# Patient Record
Sex: MALE | URBAN - METROPOLITAN AREA
[De-identification: names, ages, dates, MRNs, and addresses within clinical notes are randomized per-mention and may not be internally consistent; named-entity substitution may affect disease eponyms.]

---

## 2021-04-28 ENCOUNTER — TELEPHONE (OUTPATIENT)
Dept: OPHTHALMOLOGY | Age: 66
End: 2021-04-28

## 2021-04-30 ENCOUNTER — OFFICE VISIT (OUTPATIENT)
Dept: OPHTHALMOLOGY | Age: 66
End: 2021-04-30

## 2021-04-30 DIAGNOSIS — H16.142 SUPERFICIAL PUNCTATE KERATITIS OF LEFT EYE: Primary | ICD-10-CM

## 2021-04-30 PROCEDURE — 99202 OFFICE O/P NEW SF 15 MIN: CPT | Performed by: OPHTHALMOLOGY

## 2021-04-30 RX ORDER — ERYTHROMYCIN 5 MG/G
OINTMENT OPHTHALMIC
COMMUNITY
Start: 2021-04-27

## 2021-04-30 RX ORDER — PREDNISOLONE ACETATE 10 MG/ML
1 SUSPENSION/ DROPS OPHTHALMIC 4 TIMES DAILY
Qty: 5 ML | Refills: 0 | Status: SHIPPED | OUTPATIENT
Start: 2021-04-30

## 2021-05-22 ENCOUNTER — TELEPHONE (OUTPATIENT)
Dept: AUDIOLOGY | Age: 66
End: 2021-05-22

## 2021-05-26 ENCOUNTER — TELEPHONE (OUTPATIENT)
Dept: AUDIOLOGY | Age: 66
End: 2021-05-26

## 2021-05-26 ENCOUNTER — OFFICE VISIT (OUTPATIENT)
Dept: AUDIOLOGY | Age: 66
End: 2021-05-26

## 2021-05-26 DIAGNOSIS — H90.3 SENSORINEURAL HEARING LOSS (SNHL) OF BOTH EARS: Primary | ICD-10-CM

## 2021-05-26 PROCEDURE — 92557 COMPREHENSIVE HEARING TEST: CPT | Performed by: AUDIOLOGIST

## 2021-05-26 PROCEDURE — 92567 TYMPANOMETRY: CPT | Performed by: AUDIOLOGIST

## 2024-01-26 ENCOUNTER — TELEPHONE (OUTPATIENT)
Dept: GASTROENTEROLOGY | Age: 69
End: 2024-01-26

## 2024-01-29 ENCOUNTER — TELEPHONE (OUTPATIENT)
Dept: GASTROENTEROLOGY | Age: 69
End: 2024-01-29

## 2024-02-16 ENCOUNTER — TELEPHONE (OUTPATIENT)
Dept: BEHAVIORAL HEALTH | Age: 69
End: 2024-02-16

## 2024-04-16 ENCOUNTER — APPOINTMENT (OUTPATIENT)
Dept: OTOLARYNGOLOGY | Age: 69
End: 2024-04-16

## 2024-04-16 VITALS — BODY MASS INDEX: 27.03 KG/M2 | WEIGHT: 225 LBS

## 2024-04-16 DIAGNOSIS — R06.02 SOB (SHORTNESS OF BREATH): Primary | ICD-10-CM

## 2024-04-16 DIAGNOSIS — Z91.09 ENVIRONMENTAL ALLERGIES: ICD-10-CM

## 2024-04-16 DIAGNOSIS — K21.9 GASTROESOPHAGEAL REFLUX DISEASE WITHOUT ESOPHAGITIS: ICD-10-CM

## 2024-04-16 DIAGNOSIS — J32.9 SINUSITIS, UNSPECIFIED CHRONICITY, UNSPECIFIED LOCATION: ICD-10-CM

## 2024-04-16 PROCEDURE — 31575 DIAGNOSTIC LARYNGOSCOPY: CPT | Performed by: OTOLARYNGOLOGY

## 2024-04-16 PROCEDURE — 99203 OFFICE O/P NEW LOW 30 MIN: CPT | Performed by: OTOLARYNGOLOGY

## 2024-04-16 RX ORDER — METOPROLOL SUCCINATE 100 MG/1
100 TABLET, EXTENDED RELEASE ORAL DAILY
COMMUNITY
Start: 2024-03-25

## 2024-04-16 RX ORDER — AMITRIPTYLINE HYDROCHLORIDE 25 MG/1
37.5 TABLET, FILM COATED ORAL NIGHTLY
COMMUNITY

## 2024-04-16 RX ORDER — OMEPRAZOLE 20 MG/1
20 CAPSULE, DELAYED RELEASE ORAL DAILY
COMMUNITY
End: 2024-04-16 | Stop reason: DRUGHIGH

## 2024-04-16 RX ORDER — PAROXETINE HYDROCHLORIDE 40 MG/1
40 TABLET, FILM COATED ORAL DAILY
COMMUNITY
Start: 2024-03-16

## 2024-04-16 RX ORDER — NABUMETONE 750 MG/1
750 TABLET, FILM COATED ORAL 2 TIMES DAILY PRN
COMMUNITY

## 2024-04-16 RX ORDER — OMEPRAZOLE 20 MG/1
20 CAPSULE, DELAYED RELEASE ORAL 2 TIMES DAILY
Qty: 180 CAPSULE | Refills: 0 | Status: SHIPPED | OUTPATIENT
Start: 2024-04-16

## 2024-04-16 RX ORDER — TAMSULOSIN HYDROCHLORIDE 0.4 MG/1
0.4 CAPSULE ORAL DAILY
COMMUNITY

## 2024-04-16 RX ORDER — BUSPIRONE HYDROCHLORIDE 15 MG/1
1 TABLET ORAL 2 TIMES DAILY
COMMUNITY

## 2024-04-16 RX ORDER — GABAPENTIN 100 MG/1
200 CAPSULE ORAL 3 TIMES DAILY
COMMUNITY
Start: 2024-03-12

## 2024-04-16 RX ORDER — METRONIDAZOLE 500 MG/1
500 TABLET ORAL EVERY 12 HOURS
COMMUNITY
Start: 2024-01-15

## 2024-04-16 RX ORDER — PAROXETINE 10 MG/1
10 TABLET, FILM COATED ORAL EVERY MORNING
COMMUNITY

## 2024-04-22 ENCOUNTER — TELEPHONE (OUTPATIENT)
Dept: ALLERGY | Age: 69
End: 2024-04-22

## 2024-04-23 ENCOUNTER — LAB SERVICES (OUTPATIENT)
Dept: LAB | Age: 69
End: 2024-04-23

## 2024-04-23 ENCOUNTER — OFFICE VISIT (OUTPATIENT)
Dept: ALLERGY | Age: 69
End: 2024-04-23
Attending: OTOLARYNGOLOGY

## 2024-04-23 VITALS
WEIGHT: 225 LBS | HEIGHT: 77 IN | TEMPERATURE: 96.1 F | OXYGEN SATURATION: 98 % | HEART RATE: 59 BPM | DIASTOLIC BLOOD PRESSURE: 92 MMHG | SYSTOLIC BLOOD PRESSURE: 160 MMHG | BODY MASS INDEX: 26.57 KG/M2

## 2024-04-23 DIAGNOSIS — R06.02 SHORTNESS OF BREATH: ICD-10-CM

## 2024-04-23 DIAGNOSIS — R09.A2 GLOBUS SENSATION: ICD-10-CM

## 2024-04-23 DIAGNOSIS — H10.10 ALLERGIC RHINOCONJUNCTIVITIS: ICD-10-CM

## 2024-04-23 DIAGNOSIS — J30.9 ALLERGIC RHINOCONJUNCTIVITIS: ICD-10-CM

## 2024-04-23 DIAGNOSIS — J98.01 BRONCHOSPASM: ICD-10-CM

## 2024-04-23 DIAGNOSIS — J30.9 ALLERGIC RHINOCONJUNCTIVITIS: Primary | ICD-10-CM

## 2024-04-23 DIAGNOSIS — H10.10 ALLERGIC RHINOCONJUNCTIVITIS: Primary | ICD-10-CM

## 2024-04-23 DIAGNOSIS — I10 PRIMARY HYPERTENSION: ICD-10-CM

## 2024-04-23 LAB
BASOPHILS # BLD: 0 K/MCL (ref 0–0.3)
BASOPHILS NFR BLD: 1 %
DEPRECATED RDW RBC: 40.9 FL (ref 39–50)
EOSINOPHIL # BLD: 0.1 K/MCL (ref 0–0.5)
EOSINOPHIL NFR BLD: 1 %
ERYTHROCYTE [DISTWIDTH] IN BLOOD: 12.4 % (ref 11–15)
HCT VFR BLD CALC: 39 % (ref 39–51)
HGB BLD-MCNC: 13.6 G/DL (ref 13–17)
IMM GRANULOCYTES # BLD AUTO: 0.1 K/MCL (ref 0–0.2)
IMM GRANULOCYTES # BLD: 1 %
LYMPHOCYTES # BLD: 0.9 K/MCL (ref 1–4)
LYMPHOCYTES NFR BLD: 14 %
MCH RBC QN AUTO: 31.8 PG (ref 26–34)
MCHC RBC AUTO-ENTMCNC: 34.9 G/DL (ref 32–36.5)
MCV RBC AUTO: 91.1 FL (ref 78–100)
MONOCYTES # BLD: 0.4 K/MCL (ref 0.3–0.9)
MONOCYTES NFR BLD: 7 %
NEUTROPHILS # BLD: 5.2 K/MCL (ref 1.8–7.7)
NEUTROPHILS NFR BLD: 76 %
NRBC BLD MANUAL-RTO: 0 /100 WBC
PLATELET # BLD AUTO: 299 K/MCL (ref 140–450)
RBC # BLD: 4.28 MIL/MCL (ref 4.5–5.9)
WBC # BLD: 6.7 K/MCL (ref 4.2–11)

## 2024-04-23 PROCEDURE — 85025 COMPLETE CBC W/AUTO DIFF WBC: CPT | Performed by: INTERNAL MEDICINE

## 2024-04-23 PROCEDURE — 36415 COLL VENOUS BLD VENIPUNCTURE: CPT | Performed by: NURSE PRACTITIONER

## 2024-04-23 PROCEDURE — 99204 OFFICE O/P NEW MOD 45 MIN: CPT | Performed by: NURSE PRACTITIONER

## 2024-04-23 PROCEDURE — 3080F DIAST BP >= 90 MM HG: CPT | Performed by: NURSE PRACTITIONER

## 2024-04-23 PROCEDURE — 86003 ALLG SPEC IGE CRUDE XTRC EA: CPT | Performed by: INTERNAL MEDICINE

## 2024-04-23 PROCEDURE — 3077F SYST BP >= 140 MM HG: CPT | Performed by: NURSE PRACTITIONER

## 2024-04-23 PROCEDURE — 82785 ASSAY OF IGE: CPT | Performed by: CLINICAL MEDICAL LABORATORY

## 2024-04-23 RX ORDER — FLUTICASONE PROPIONATE AND SALMETEROL 100; 50 UG/1; UG/1
1 POWDER RESPIRATORY (INHALATION)
Qty: 1 EACH | Refills: 5 | Status: SHIPPED | OUTPATIENT
Start: 2024-04-23

## 2024-04-23 RX ORDER — ALBUTEROL SULFATE 90 UG/1
2 AEROSOL, METERED RESPIRATORY (INHALATION) EVERY 4 HOURS PRN
Qty: 1 EACH | Refills: 0 | Status: SHIPPED | OUTPATIENT
Start: 2024-04-23

## 2024-04-23 ASSESSMENT — ENCOUNTER SYMPTOMS
SINUS PAIN: 0
SHORTNESS OF BREATH: 1
FACIAL SWELLING: 0
HEADACHES: 0
NERVOUS/ANXIOUS: 0
SORE THROAT: 0
RHINORRHEA: 1
ABDOMINAL PAIN: 0
FEVER: 0
VOMITING: 0
APPETITE CHANGE: 0
ADENOPATHY: 0
DIARRHEA: 0
WHEEZING: 1
SINUS PRESSURE: 1
SLEEP DISTURBANCE: 0
CHEST TIGHTNESS: 0
COUGH: 1

## 2024-04-24 LAB
ALLERGEN:  CLADOSPORIUM HERBARUM (HORMODENDRUM), IGE - SEQUOIA: <0.1 KU/L
ALLERGEN:  COCKROACH (GERMAN), IGE - SEQUOIA: <0.1 KU/L
ALLERGEN:  COCKSFOOT GRASS (ORCHARD), IGE - SEQUOIA: <0.1 KU/L
ALLERGEN:  D. FARINAE, IGE - SEQUOIA: <0.1 KU/L
ALLERGEN:  D. PTERONYSSINUS, IGE - SEQUOIA: <0.1 KU/L
ALLERGEN:  DOG DANDER, IGE - SEQUOIA: <0.1 KU/L
ALLERGEN: ALTERNARIA TENUIS, IGE - SEQUOIA: <0.1 KU/L
ALLERGEN: ASPERGILLUS FUMIGATUS, IGE - SEQUOIA: <0.1 KU/L
ALLERGEN: ASPERGILLUS NIGER, IGE - SEQUOIA: <0.1 KU/L
ALLERGEN: AUREOBASIDIUM PULLULANS, IGE - SEQUOIA: <0.1 KU/L
ALLERGEN: BEECH TREE, IGE - SEQUOIA: <0.1 KU/L
ALLERGEN: BERMUDA GRASS, IGE - SEQUOIA: <0.1 KU/L
ALLERGEN: BIRCH TREE, IGE - SEQUOIA: <0.1 KU/L
ALLERGEN: BOX ELDER TREE, IGE - SEQUOIA: <0.1 KU/L
ALLERGEN: CAT DANDER, IGE - SEQUOIA: <0.1 KU/L
ALLERGEN: CEDAR OR RED CEDAR TREE - SEQUOIA: <0.1 KU/L
ALLERGEN: ELM TREE, IGE - SEQUOIA: <0.1 KU/L
ALLERGEN: ENGLISH PLANTAIN, IGE - SEQUOIA: <0.1 KU/L
ALLERGEN: EPICOCCUM PURPURASCENS, IGE - SEQUOIA: <0.1 KU/L
ALLERGEN: FUSARIUM PROLIFERATUM, IGE - SEQUOIA: <0.1 KU/L
ALLERGEN: HICKORY AND PECAN TREE, IGE - SEQUOIA: <0.1 KU/L
ALLERGEN: JOHNSON GRASS, IGE - SEQUOIA: <0.1 KU/L
ALLERGEN: MEADOW GRASS (KENTUCKY), IGE - SEQUOIA: <0.1 KU/L
ALLERGEN: MUCOR RACEMOSUS, IGE - SEQUOIA: <0.1 KU/L
ALLERGEN: MUGWORT, IGE - SEQUOIA: <0.1 KU/L
ALLERGEN: OAK TREE, IGE - SEQUOIA: <0.1 KU/L
ALLERGEN: PENICILLIUM CHRYSOGENUM, IGE - SEQUOIA: <0.1 KU/L
ALLERGEN: PHOMA BETAE, IGE - SEQUOIA: <0.1 KU/L
ALLERGEN: RAGWEED, GIANT, IGE - SEQUOIA: <0.1 KU/L
ALLERGEN: REDTOP GRASS, IGE - SEQUOIA: <0.1 KU/L
ALLERGEN: RUSSIAN THISTLE, IGE - SEQUOIA: <0.1 KU/L
ALLERGEN: RYE GRASS, IGE - SEQUOIA: <0.1 KU/L
ALLERGEN: SETOMELANOMMA ROSTRATA, IGE - SEQUOIA: <0.1 KU/L
ALLERGEN: SHORT RAGWEED, IGE - SEQUOIA: <0.1 KU/L
ALLERGEN: SYCAMORE TREE, IGE - SEQUOIA: <0.1 KU/L
ALLERGEN: TIMOTHY GRASS, IGE - SEQUOIA: <0.1 KU/L
ALLERGEN: WALNUT TREE, IGE - SEQUOIA: <0.1 KU/L
ALLERGEN: WHITE ASH TREE, IGE - SEQUOIA: <0.1 KU/L
ALLERGEN: WILLOW TREE, IGE - SEQUOIA: <0.1 KU/L
IGE SERPL-ACNC: 6.5 IUNITS/ML

## 2024-04-30 ENCOUNTER — HOSPITAL ENCOUNTER (OUTPATIENT)
Dept: CT IMAGING | Age: 69
Discharge: HOME | End: 2024-04-30
Attending: OTOLARYNGOLOGY

## 2024-04-30 DIAGNOSIS — J32.9 SINUSITIS, UNSPECIFIED CHRONICITY, UNSPECIFIED LOCATION: ICD-10-CM

## 2024-04-30 PROCEDURE — 70486 CT MAXILLOFACIAL W/O DYE: CPT

## 2024-05-06 ENCOUNTER — TELEPHONE (OUTPATIENT)
Dept: BEHAVIORAL HEALTH | Age: 69
End: 2024-05-06

## 2024-05-07 ENCOUNTER — IMAGING SERVICES (OUTPATIENT)
Dept: GENERAL RADIOLOGY | Age: 69
End: 2024-05-07
Attending: INTERNAL MEDICINE

## 2024-05-07 ENCOUNTER — APPOINTMENT (OUTPATIENT)
Dept: PULMONOLOGY | Age: 69
End: 2024-05-07
Attending: OTOLARYNGOLOGY

## 2024-05-07 VITALS
DIASTOLIC BLOOD PRESSURE: 96 MMHG | HEART RATE: 69 BPM | TEMPERATURE: 96.5 F | SYSTOLIC BLOOD PRESSURE: 164 MMHG | OXYGEN SATURATION: 98 % | WEIGHT: 226.96 LBS | HEIGHT: 77 IN | BODY MASS INDEX: 26.8 KG/M2

## 2024-05-07 DIAGNOSIS — R07.89 CHEST TIGHTNESS: ICD-10-CM

## 2024-05-07 DIAGNOSIS — R06.02 SOB (SHORTNESS OF BREATH): Primary | ICD-10-CM

## 2024-05-07 DIAGNOSIS — R06.02 SOB (SHORTNESS OF BREATH): ICD-10-CM

## 2024-05-07 PROCEDURE — 71046 X-RAY EXAM CHEST 2 VIEWS: CPT | Performed by: RADIOLOGY

## 2024-05-08 ENCOUNTER — TELEPHONE (OUTPATIENT)
Dept: BEHAVIORAL HEALTH | Age: 69
End: 2024-05-08

## 2024-05-13 ENCOUNTER — OFFICE VISIT (OUTPATIENT)
Dept: PULMONOLOGY | Age: 69
End: 2024-05-13
Attending: INTERNAL MEDICINE

## 2024-05-13 ENCOUNTER — TELEPHONE (OUTPATIENT)
Dept: PULMONOLOGY | Age: 69
End: 2024-05-13

## 2024-05-13 ENCOUNTER — APPOINTMENT (OUTPATIENT)
Dept: PULMONOLOGY | Age: 69
End: 2024-05-13
Attending: INTERNAL MEDICINE

## 2024-05-13 VITALS — WEIGHT: 226 LBS | BODY MASS INDEX: 26.68 KG/M2 | HEIGHT: 77 IN

## 2024-05-13 DIAGNOSIS — R06.02 SOB (SHORTNESS OF BREATH): ICD-10-CM

## 2024-05-13 RX ORDER — ALBUTEROL SULFATE 90 UG/1
AEROSOL, METERED RESPIRATORY (INHALATION)
Qty: 18 G | Refills: 0 | Status: SHIPPED | OUTPATIENT
Start: 2024-05-13

## 2024-05-20 ENCOUNTER — TELEPHONE (OUTPATIENT)
Dept: PULMONOLOGY | Age: 69
End: 2024-05-20

## 2024-06-06 ENCOUNTER — APPOINTMENT (OUTPATIENT)
Dept: BEHAVIORAL HEALTH | Age: 69
End: 2024-06-06

## 2024-06-06 DIAGNOSIS — F33.1 MODERATE EPISODE OF RECURRENT MAJOR DEPRESSIVE DISORDER  (CMD): Primary | ICD-10-CM

## 2024-06-06 DIAGNOSIS — F41.1 GENERALIZED ANXIETY DISORDER: ICD-10-CM

## 2024-06-06 PROCEDURE — 99204 OFFICE O/P NEW MOD 45 MIN: CPT | Performed by: PSYCHIATRY & NEUROLOGY

## 2024-06-06 RX ORDER — ARIPIPRAZOLE 10 MG/1
TABLET ORAL
Qty: 30 TABLET | Refills: 0 | Status: SHIPPED | OUTPATIENT
Start: 2024-06-06

## 2024-06-11 ENCOUNTER — APPOINTMENT (OUTPATIENT)
Dept: ALLERGY | Age: 69
End: 2024-06-11

## 2024-06-17 ENCOUNTER — APPOINTMENT (OUTPATIENT)
Dept: ALLERGY | Age: 69
End: 2024-06-17

## 2024-07-01 ENCOUNTER — APPOINTMENT (OUTPATIENT)
Dept: BEHAVIORAL HEALTH | Age: 69
End: 2024-07-01

## 2024-07-16 ENCOUNTER — APPOINTMENT (OUTPATIENT)
Dept: BEHAVIORAL HEALTH | Age: 69
End: 2024-07-16

## 2024-07-16 DIAGNOSIS — F33.1 MODERATE EPISODE OF RECURRENT MAJOR DEPRESSIVE DISORDER  (CMD): Primary | ICD-10-CM

## 2024-07-16 DIAGNOSIS — F41.1 GENERALIZED ANXIETY DISORDER: ICD-10-CM

## 2024-07-16 PROCEDURE — 99213 OFFICE O/P EST LOW 20 MIN: CPT | Performed by: PSYCHIATRY & NEUROLOGY

## 2024-07-23 ENCOUNTER — APPOINTMENT (OUTPATIENT)
Dept: BEHAVIORAL HEALTH | Age: 69
End: 2024-07-23

## 2024-07-23 DIAGNOSIS — F41.1 GENERALIZED ANXIETY DISORDER: ICD-10-CM

## 2024-07-23 DIAGNOSIS — F33.1 MODERATE EPISODE OF RECURRENT MAJOR DEPRESSIVE DISORDER  (CMD): Primary | ICD-10-CM

## 2024-07-23 PROCEDURE — 99214 OFFICE O/P EST MOD 30 MIN: CPT | Performed by: PSYCHIATRY & NEUROLOGY

## 2024-07-23 RX ORDER — ARIPIPRAZOLE 10 MG/1
TABLET ORAL
Qty: 90 TABLET | Refills: 0 | Status: SHIPPED | OUTPATIENT
Start: 2024-07-23 | End: 2024-07-23 | Stop reason: CLARIF

## 2024-07-23 RX ORDER — AMITRIPTYLINE HYDROCHLORIDE 50 MG/1
50 TABLET, FILM COATED ORAL NIGHTLY
Qty: 90 TABLET | Refills: 0 | Status: SHIPPED | OUTPATIENT
Start: 2024-07-23

## 2024-07-23 RX ORDER — GABAPENTIN 100 MG/1
200 CAPSULE ORAL 3 TIMES DAILY
Qty: 270 CAPSULE | Refills: 0 | Status: SHIPPED | OUTPATIENT
Start: 2024-07-23

## 2024-07-23 RX ORDER — PAROXETINE HYDROCHLORIDE 40 MG/1
40 TABLET, FILM COATED ORAL DAILY
Qty: 90 TABLET | Refills: 0 | Status: SHIPPED | OUTPATIENT
Start: 2024-07-23

## 2024-07-29 RX ORDER — FLUTICASONE PROPIONATE AND SALMETEROL 100; 50 UG/1; UG/1
1 POWDER RESPIRATORY (INHALATION) 2 TIMES DAILY
Qty: 60 EACH | Refills: 5 | Status: SHIPPED | OUTPATIENT
Start: 2024-07-29

## 2024-08-23 ENCOUNTER — APPOINTMENT (OUTPATIENT)
Dept: BEHAVIORAL HEALTH | Age: 69
End: 2024-08-23

## 2024-08-23 DIAGNOSIS — F41.1 GENERALIZED ANXIETY DISORDER: ICD-10-CM

## 2024-08-23 DIAGNOSIS — F33.1 MODERATE EPISODE OF RECURRENT MAJOR DEPRESSIVE DISORDER  (CMD): Primary | ICD-10-CM

## 2024-08-23 RX ORDER — GABAPENTIN 300 MG/1
300 CAPSULE ORAL 2 TIMES DAILY
Qty: 180 CAPSULE | Refills: 0 | Status: SHIPPED | OUTPATIENT
Start: 2024-08-23

## 2024-08-23 RX ORDER — LORAZEPAM 0.5 MG/1
0.5 TABLET ORAL EVERY 8 HOURS PRN
Qty: 200 TABLET | Refills: 0 | Status: SHIPPED | OUTPATIENT
Start: 2024-08-23

## 2024-08-23 RX ORDER — GABAPENTIN 100 MG/1
200 CAPSULE ORAL 2 TIMES DAILY
Qty: 270 CAPSULE | Refills: 0 | Status: SHIPPED | OUTPATIENT
Start: 2024-08-23 | End: 2024-08-23 | Stop reason: SDUPTHER

## 2024-09-03 ENCOUNTER — APPOINTMENT (OUTPATIENT)
Dept: BEHAVIORAL HEALTH | Age: 69
End: 2024-09-03

## 2024-11-01 RX ORDER — PAROXETINE 40 MG/1
40 TABLET, FILM COATED ORAL DAILY
Qty: 90 TABLET | Refills: 0 | Status: SHIPPED | OUTPATIENT
Start: 2024-11-01

## 2024-11-13 RX ORDER — AMITRIPTYLINE HYDROCHLORIDE 50 MG/1
50 TABLET ORAL NIGHTLY
Qty: 90 TABLET | Refills: 0 | Status: SHIPPED | OUTPATIENT
Start: 2024-11-13

## 2024-11-15 RX ORDER — GABAPENTIN 300 MG/1
300 CAPSULE ORAL 2 TIMES DAILY
Qty: 180 CAPSULE | Refills: 0 | Status: SHIPPED | OUTPATIENT
Start: 2024-11-15

## 2024-11-15 RX ORDER — AMITRIPTYLINE HYDROCHLORIDE 50 MG/1
50 TABLET ORAL NIGHTLY
Qty: 90 TABLET | Refills: 0 | OUTPATIENT
Start: 2024-11-15

## 2024-11-15 RX ORDER — LORAZEPAM 0.5 MG/1
0.5 TABLET ORAL EVERY 8 HOURS PRN
Qty: 200 TABLET | Refills: 0 | Status: SHIPPED | OUTPATIENT
Start: 2024-11-15

## 2025-02-02 RX ORDER — PAROXETINE 40 MG/1
40 TABLET, FILM COATED ORAL DAILY
Qty: 90 TABLET | Refills: 0 | OUTPATIENT
Start: 2025-02-02

## 2025-02-02 RX ORDER — AMITRIPTYLINE HYDROCHLORIDE 50 MG/1
50 TABLET ORAL NIGHTLY
Qty: 90 TABLET | Refills: 0 | OUTPATIENT
Start: 2025-02-02

## 2025-02-03 RX ORDER — AMITRIPTYLINE HYDROCHLORIDE 50 MG/1
50 TABLET ORAL NIGHTLY
Qty: 90 TABLET | Refills: 0 | Status: SHIPPED | OUTPATIENT
Start: 2025-02-03

## 2025-02-03 RX ORDER — PAROXETINE 40 MG/1
40 TABLET, FILM COATED ORAL DAILY
Qty: 90 TABLET | Refills: 0 | Status: SHIPPED | OUTPATIENT
Start: 2025-02-03

## 2025-02-04 ENCOUNTER — TELEPHONE (OUTPATIENT)
Age: 70
End: 2025-02-04

## 2025-02-06 ENCOUNTER — APPOINTMENT (OUTPATIENT)
Age: 70
End: 2025-02-06

## 2025-02-24 NOTE — H&P (VIEW-ONLY)
Orthopaedic Foot & Ankle Surgery  [facility]  New Fracture Care Note  Patient:  Adrien Curran   :  1955 - 69 year old male   [facility] Medical Record: [id number]  Date of Service:  2025   CHIEF COMPLAINT / REASON FOR VISIT   This is a NEW PATIENT VISIT for RIGHT ANKLE PAIN, INJURY  Patient presents with:  Consult: Evaluate RIGHT ankle. Patient states on 2025 slipped on ice injuring right ankle. Patient was seen @ [facility]. Advised to go to ER and went to [facility] ER 2025. Dx with distal fib fx. Patient was splinted and here for follow-up care.     History of Present Illness   The patient describes their injury as follows:  Seen at [facility], sent to [facility] ER  Brings a report with diagnosis of lateral fibula fracture  Is in a splint, restricted from wbing using a walker  Location: CLOSED BIMALLEOLAR  ANKLE FRACTURE-DISLOCATION  Quality: The pain is described as SHARP, STABBING, THROBBING  Severity: The pain rates 5/10 and ranges from 4/10 to 7/10   Duration: This injury occurred 3 DAY(S) ago and resulted from - TRAUMATIC EVENT - SLIP AND FALL  Timing: The pain has been CONSTANT  Context: The location has been WITHOUT CHANGE. The quality has been WITHOUT CHANGE. The severity has been WAXING AND WANING  Modifying Factors: The pain improves with REST AND AVOIDING ACTIVITIES THAT CAUSE PAIN, IMMOBILIZATION WITH Immobilization type: SHORT LEG SPLINT without SIDE SLABS, ELEVATION  and worsens with DEPENDENCY OF THE LIMB.  Associated Signs/Symptoms: SWELLING, TENDERNESS, and BRUISING also occurs with this injury.  Other injuries: DENIES  Prior Treatment: ER / ICC VISIT, XRAYS, Immobilization type: SHORT LEG SPLINT without SIDE SLABS, and CRUTCHES  Past Medical History   History reviewed. No pertinent past medical history.  HISTORY OF VENOUS THROMBOEMBOLIC DISEASE: Patient denies personal or family VTED history  Past Surgical History   History reviewed.  No pertinent surgical history.  Social and Family History   Social History    Tobacco Use      Smoking status: Never      Smokeless tobacco: Never    Alcohol use: Not on file    Drug use: Not on file    History reviewed. No pertinent family history.  Current Medications     Current Outpatient Medications   Medication Sig Dispense Refill   • aspirin 81 MG Oral Tab EC Take 1 tablet (81 mg total) by mouth daily. 30 tablet 0   • docusate sodium (COLACE) 100 MG Oral Cap Take 1 capsule (100 mg total) by mouth 2 (two) times daily. Begin the night after surgery 100 capsule 0   • gabapentin 300 MG Oral Cap Take 1 capsule (300 mg total) by mouth nightly for 7 days. 7 capsule 0   • oxyCODONE 5 MG Oral Tab Take 1 tablet (5 mg total) by mouth every 6 (six) hours as needed for Pain (for pain more than 7/10). 15 tablet 0   • acetaminophen 500 MG Oral Cap Take 2 capsules (1,000 mg total) by mouth every 6 (six) hours. Two pills every six hours until pain is less than 5/10, then take as needed 100 capsule 0     Allergies   Not on File  Physical Examination   VITALS: @ BMI: There is no height or weight on file to calculate BMI.  Constitutional: Patient is well-developed, well-nourished, and in no distress.   HENT: Normocephalic and atraumatic. Moist mucous membranes   Eyes: Clear Conjunctivae Right & Left eye without discharge. No scleral icterus, bilateral.   Neck: Neck supple. No JVD, Tracheal deviation or thyromegaly visible.  Cardiovascular: Normal pulses  Pulmonary/Chest: Effort normal. No audible stridor or wheezes No respiratory distress  Abdominal: No visible distension.   Neurological: Alert and oriented to person, place, and time. GCS score is 15.   Skin: Warm, dry Normal turgor non-diaphoretic. No rashes. No erythema. No pallor.   Psychiatric: Mood, memory, affect and judgment normal.     MUSCULOSKELETAL: The affected extremity was examined.  The patient presents in Immobilization type: SHORT LEG SPLINT without SIDE SLABS  using CRUTCHES  It was removed for the examination  Tenderness at fracture site: MODERATE  Swelling around fracture site: MODERATE  Bruising MODERATE  Motor Strength: DIMINISHED 4/5  Light touch sensation: Diminished  Pulses: 2 second capillary refill noted  MEDIAL ANKLE TENDERNESS, SWELLING AND BRUISING  XRAY & ADVANCED IMAGING INTERPRETATION    Prior Imaging: RADIOLOGIST REPORT OF PRIOR IMAGING STUDIES WAS REVIEWED - NO IMAGES REVIEWED  XR ANKLE (MIN 3 VIEWS), RIGHT (CPT=73610)  I ordered and viewed the following Xray images Ankle 3 views FWB Right  Finding(s):   Long spiral oblique Lucent fracture(s) line seen involving   the Distal fibula . The fracture(s) has displacement of 3=4 mm, and 6-7 mm   lateral Ankle Joint Subluxation - as medial clear space widening, and soft   tissue swelling  Interpretation(s):  Fracture - BIMALLEOLAR  ANKLE FRACTURE  and   Dislocation - Ankle this is an unstable ankle fracture pattern    DIAGNOSES:   Diagnoses and all orders for this visit:    Ankle fracture, bimalleolar, closed, right, initial encounter  -     XR ANKLE (MIN 3 VIEWS), RIGHT (CPT=73610); Future  -     APPLICATION SHORT LEG SPLINT CALF FOOT  -     CAST SUPPLIES (EG. PLASTER)  -     [facility] SURGERY SCHEDULING MSK; Future  -     aspirin 81 MG Oral Tab EC; Take 1 tablet (81 mg total) by mouth daily.  -     docusate sodium (COLACE) 100 MG Oral Cap; Take 1 capsule (100 mg total) by mouth 2 (two) times daily. Begin the night after surgery  -     gabapentin 300 MG Oral Cap; Take 1 capsule (300 mg total) by mouth nightly for 7 days.  -     oxyCODONE 5 MG Oral Tab; Take 1 tablet (5 mg total) by mouth every 6 (six) hours as needed for Pain (for pain more than 7/10).  -     acetaminophen 500 MG Oral Cap; Take 2 capsules (1,000 mg total) by mouth every 6 (six) hours. Two pills every six hours until pain is less than 5/10, then take as needed    Ankle syndesmosis disruption, right, initial encounter  -     APPLICATION SHORT LEG  SPLINT CALF FOOT  -     CAST SUPPLIES (EG. PLASTER)  -     [facility] SURGERY SCHEDULING MSK; Future    Hypovitaminosis D  -     VITAMIN D, 25-HYDROXY; Future    Preoperative testing  -     COMPLETE BLOOD COUNT (CBC) WITHOUT DIFFERENTIAL; Future  -     COMPREHENSIVE METABOLIC PANEL; Future  -     HEMOGLOBIN A1C; Future      PATIENT ASSESSMENT & MEDICAL DECISION-MAKING:   DME dispensed at this encounter - Immobilization type: SHORT LEG SPLINT with SIDE SLABS         Based on my evaluation today, impression, recommendations and plan is:  The treatment plan is:  -Begin Fracture care  - RIICE AND PREPARE FOR ORIF Non-weight bearing Immobilization type: SHORT LEG SPLINT with SIDE SLABS  -to use non-opioid before opioid medications to mange pain  -to continue elevating affected limb above heart level to reduce swelling  -to [facility]  -Activity status:  HOME CONFINED    A letter was not given to the patient.    The patient was instructed to return for evaluation:  AFTER SURGERY  At the next visit, the patient will not require xrays. N/A    The patient verbalized understanding of and willingness to comply with the treatment plan.  The patient will use the [facility] main phone number if they have questions, concerns or problems  I will assess the patient's progress at the next visit and determine the next steps in the treatment plan.     Adan PETERSEN MD performed all aspects of the evaluation and management  of this encounter. I diagnosed the patient and formulated the treatment options. This information was presented to and discussed with the patient including risk and benefits. All questions were answered and the patient acknowledged understanding The patient, with my input, determined how to proceed.

## 2025-02-25 RX ORDER — SACUBITRIL AND VALSARTAN 97; 103 MG/1; MG/1
1 TABLET, FILM COATED ORAL 2 TIMES DAILY
COMMUNITY

## 2025-02-25 RX ORDER — TAMSULOSIN HYDROCHLORIDE 0.4 MG/1
0.4 CAPSULE ORAL NIGHTLY
COMMUNITY

## 2025-02-25 RX ORDER — PAROXETINE 40 MG/1
40 TABLET, FILM COATED ORAL NIGHTLY
COMMUNITY

## 2025-02-25 RX ORDER — METOPROLOL SUCCINATE 100 MG/1
100 TABLET, EXTENDED RELEASE ORAL DAILY
COMMUNITY

## 2025-02-25 RX ORDER — LORAZEPAM 0.5 MG/1
0.5 TABLET ORAL DAILY PRN
COMMUNITY

## 2025-02-25 RX ORDER — GABAPENTIN 300 MG/1
300 CAPSULE ORAL NIGHTLY
COMMUNITY

## 2025-02-25 RX ORDER — OMEPRAZOLE 20 MG/1
20 CAPSULE, DELAYED RELEASE ORAL
COMMUNITY

## 2025-02-25 RX ORDER — HEPARIN SODIUM 5000 [USP'U]/ML
5000 INJECTION, SOLUTION INTRAVENOUS; SUBCUTANEOUS ONCE
Status: CANCELLED | OUTPATIENT
Start: 2025-02-25 | End: 2025-02-25

## 2025-02-25 RX ORDER — ASCORBIC ACID 500 MG
500 TABLET ORAL DAILY
COMMUNITY

## 2025-02-25 RX ORDER — AMITRIPTYLINE HYDROCHLORIDE 50 MG/1
50 TABLET ORAL NIGHTLY
COMMUNITY

## 2025-02-26 ENCOUNTER — EXTERNAL LAB (OUTPATIENT)
Dept: HEALTH INFORMATION MANAGEMENT | Facility: OTHER | Age: 70
End: 2025-02-26

## 2025-02-26 LAB
25(OH)D3+25(OH)D2 SERPL-MCNC: 37 NG/ML (ref 30–100)
ALBUMIN SERPL-MCNC: 4.1 G/DL (ref 3.6–5.1)
ALBUMIN/GLOB SERPL: 1.6 (CALC) (ref 1–2.5)
ALP SERPL-CCNC: 42 U/L (ref 35–144)
ALT SERPL-CCNC: 16 U/L (ref 9–46)
AST SERPL-CCNC: 23 U/L (ref 10–35)
BASOPHILS # BLD: 32 CELLS/UL (ref 0–200)
BASOPHILS NFR BLD: 0.5 %
BILIRUB SERPL-MCNC: 0.4 MG/DL (ref 0.2–1.2)
BUN SERPL-MCNC: 25 MG/DL (ref 7–25)
BUN/CREAT SERPL: ABNORMAL (CALC) (ref 6–22)
CALCIUM SERPL-MCNC: 8.5 MG/DL (ref 8.6–10.3)
CHLORIDE SERPL-SCNC: 103 MMOL/L (ref 98–110)
CO2 SERPL-SCNC: 27 MMOL/L (ref 20–32)
CREAT SERPL-MCNC: 0.87 MG/DL (ref 0.7–1.28)
EOSINOPHIL # BLD: 83 CELLS/UL (ref 15–500)
EOSINOPHIL NFR BLD: 1.3 %
ERYTHROCYTE [DISTWIDTH] IN BLOOD: 11.8 % (ref 11–15)
GFR SERPLBLD SCHWARTZ-ARVRAT: 93 ML/MIN/1.73M2
GLOBULIN SER-MCNC: 2.6 G/DL (CALC) (ref 1.9–3.7)
GLUCOSE SERPL-MCNC: 69 MG/DL (ref 65–99)
HBA1C MFR BLD: 6 % OF TOTAL HGB
HCT VFR BLD CALC: 39.7 % (ref 38.5–50)
HGB BLD-MCNC: 13.6 G/DL (ref 13.2–17.1)
LENGTH OF FAST TIME PATIENT: ABNORMAL H
LYMPHOCYTES # BLD: 1229 CELLS/UL (ref 850–3900)
LYMPHOCYTES NFR BLD: 19.2 %
MCH RBC QN AUTO: 32.4 PG (ref 27–33)
MCHC RBC AUTO-ENTMCNC: 34.3 G/DL (ref 32–36)
MCV RBC AUTO: 94.5 FL (ref 80–100)
MONOCYTES # BLD: 550 CELLS/UL (ref 200–950)
MONOCYTES NFR BLD: 8.6 %
NEUTROPHILS # BLD: 4506 CELLS/UL (ref 1500–7800)
NEUTROPHILS NFR BLD: 70.4 %
PLATELET # BLD: 324 THOUSAND/UL (ref 140–400)
PMV BLD AUTO: 9.6 FL (ref 7.5–12.5)
POTASSIUM SERPL-SCNC: 4.4 MMOL/L (ref 3.5–5.3)
PROT SERPL-MCNC: 6.7 G/DL (ref 6.1–8.1)
RBC # BLD: 4.2 MILLION/UL (ref 4.2–5.8)
SODIUM SERPL-SCNC: 137 MMOL/L (ref 135–146)
WBC # BLD: 6.4 THOUSAND/UL (ref 3.8–10.8)

## 2025-02-27 ENCOUNTER — ANESTHESIA EVENT (OUTPATIENT)
Dept: SURGERY | Facility: HOSPITAL | Age: 70
End: 2025-02-27
Payer: MEDICARE

## 2025-02-28 ENCOUNTER — ANESTHESIA (OUTPATIENT)
Dept: SURGERY | Facility: HOSPITAL | Age: 70
End: 2025-02-28
Payer: MEDICARE

## 2025-02-28 ENCOUNTER — HOSPITAL ENCOUNTER (OUTPATIENT)
Facility: HOSPITAL | Age: 70
Setting detail: HOSPITAL OUTPATIENT SURGERY
Discharge: HOME | End: 2025-02-28
Attending: ORTHOPAEDIC SURGERY | Admitting: ORTHOPAEDIC SURGERY
Payer: MEDICARE

## 2025-02-28 ENCOUNTER — APPOINTMENT (OUTPATIENT)
Dept: GENERAL RADIOLOGY | Facility: HOSPITAL | Age: 70
End: 2025-02-28
Attending: ORTHOPAEDIC SURGERY
Payer: MEDICARE

## 2025-02-28 VITALS
HEART RATE: 59 BPM | TEMPERATURE: 97 F | DIASTOLIC BLOOD PRESSURE: 77 MMHG | OXYGEN SATURATION: 98 % | WEIGHT: 228 LBS | HEIGHT: 74 IN | SYSTOLIC BLOOD PRESSURE: 121 MMHG | RESPIRATION RATE: 14 BRPM | BODY MASS INDEX: 29.26 KG/M2

## 2025-02-28 PROCEDURE — 0QSG04Z REPOSITION RIGHT TIBIA WITH INTERNAL FIXATION DEVICE, OPEN APPROACH: ICD-10-PCS | Performed by: ORTHOPAEDIC SURGERY

## 2025-02-28 PROCEDURE — 0QSJ04Z REPOSITION RIGHT FIBULA WITH INTERNAL FIXATION DEVICE, OPEN APPROACH: ICD-10-PCS | Performed by: ORTHOPAEDIC SURGERY

## 2025-02-28 PROCEDURE — 76000 FLUOROSCOPY <1 HR PHYS/QHP: CPT | Performed by: ORTHOPAEDIC SURGERY

## 2025-02-28 PROCEDURE — 76942 ECHO GUIDE FOR BIOPSY: CPT | Performed by: STUDENT IN AN ORGANIZED HEALTH CARE EDUCATION/TRAINING PROGRAM

## 2025-02-28 PROCEDURE — 0SSF04Z REPOSITION RIGHT ANKLE JOINT WITH INTERNAL FIXATION DEVICE, OPEN APPROACH: ICD-10-PCS | Performed by: ORTHOPAEDIC SURGERY

## 2025-02-28 DEVICE — IMPLANTABLE DEVICE: Type: IMPLANTABLE DEVICE | Site: ANKLE | Status: FUNCTIONAL

## 2025-02-28 DEVICE — ISOLA SPINE SYSTEM ROD BENDERS (TUBULAR) SET
Type: IMPLANTABLE DEVICE | Site: ANKLE | Status: FUNCTIONAL
Brand: ISOLA

## 2025-02-28 DEVICE — K WIRE 2X150MM TRCR PT SS: Type: IMPLANTABLE DEVICE | Site: ANKLE

## 2025-02-28 DEVICE — SCREW BNE 3.5X16MM CORT HEX DRV RECESS FT ST: Type: IMPLANTABLE DEVICE | Site: ANKLE | Status: FUNCTIONAL

## 2025-02-28 RX ORDER — NALOXONE HYDROCHLORIDE 0.4 MG/ML
80 INJECTION, SOLUTION INTRAMUSCULAR; INTRAVENOUS; SUBCUTANEOUS AS NEEDED
Status: DISCONTINUED | OUTPATIENT
Start: 2025-02-28 | End: 2025-02-28

## 2025-02-28 RX ORDER — DEXAMETHASONE SODIUM PHOSPHATE 4 MG/ML
VIAL (ML) INJECTION AS NEEDED
Status: DISCONTINUED | OUTPATIENT
Start: 2025-02-28 | End: 2025-02-28 | Stop reason: SURG

## 2025-02-28 RX ORDER — ONDANSETRON 2 MG/ML
INJECTION INTRAMUSCULAR; INTRAVENOUS AS NEEDED
Status: DISCONTINUED | OUTPATIENT
Start: 2025-02-28 | End: 2025-02-28 | Stop reason: SURG

## 2025-02-28 RX ORDER — METOCLOPRAMIDE HYDROCHLORIDE 5 MG/ML
10 INJECTION INTRAMUSCULAR; INTRAVENOUS EVERY 8 HOURS PRN
Status: DISCONTINUED | OUTPATIENT
Start: 2025-02-28 | End: 2025-02-28

## 2025-02-28 RX ORDER — HYDROMORPHONE HYDROCHLORIDE 1 MG/ML
0.4 INJECTION, SOLUTION INTRAMUSCULAR; INTRAVENOUS; SUBCUTANEOUS EVERY 5 MIN PRN
Status: DISCONTINUED | OUTPATIENT
Start: 2025-02-28 | End: 2025-02-28

## 2025-02-28 RX ORDER — HYDROMORPHONE HYDROCHLORIDE 1 MG/ML
0.3 INJECTION, SOLUTION INTRAMUSCULAR; INTRAVENOUS; SUBCUTANEOUS EVERY 5 MIN PRN
Status: DISCONTINUED | OUTPATIENT
Start: 2025-02-28 | End: 2025-02-28

## 2025-02-28 RX ORDER — OXYCODONE HYDROCHLORIDE 5 MG/1
5 TABLET ORAL
COMMUNITY
Start: 2025-02-24

## 2025-02-28 RX ORDER — CLONIDINE 100 UG/ML
INJECTION, SOLUTION EPIDURAL AS NEEDED
Status: DISCONTINUED | OUTPATIENT
Start: 2025-02-28 | End: 2025-02-28 | Stop reason: SURG

## 2025-02-28 RX ORDER — VANCOMYCIN HYDROCHLORIDE
15 ONCE
Status: DISCONTINUED | OUTPATIENT
Start: 2025-02-28 | End: 2025-02-28 | Stop reason: HOSPADM

## 2025-02-28 RX ORDER — LABETALOL HYDROCHLORIDE 5 MG/ML
5 INJECTION, SOLUTION INTRAVENOUS EVERY 5 MIN PRN
Status: DISCONTINUED | OUTPATIENT
Start: 2025-02-28 | End: 2025-02-28

## 2025-02-28 RX ORDER — HYDROCODONE BITARTRATE AND ACETAMINOPHEN 5; 325 MG/1; MG/1
1 TABLET ORAL ONCE AS NEEDED
Status: DISCONTINUED | OUTPATIENT
Start: 2025-02-28 | End: 2025-02-28

## 2025-02-28 RX ORDER — PHENYLEPHRINE HCL 10 MG/ML
VIAL (ML) INJECTION AS NEEDED
Status: DISCONTINUED | OUTPATIENT
Start: 2025-02-28 | End: 2025-02-28 | Stop reason: SURG

## 2025-02-28 RX ORDER — ACETAMINOPHEN 500 MG
1000 TABLET ORAL ONCE AS NEEDED
Status: DISCONTINUED | OUTPATIENT
Start: 2025-02-28 | End: 2025-02-28

## 2025-02-28 RX ORDER — ONDANSETRON 2 MG/ML
4 INJECTION INTRAMUSCULAR; INTRAVENOUS EVERY 6 HOURS PRN
Status: DISCONTINUED | OUTPATIENT
Start: 2025-02-28 | End: 2025-02-28

## 2025-02-28 RX ORDER — BUPIVACAINE HYDROCHLORIDE 5 MG/ML
INJECTION, SOLUTION EPIDURAL; INTRACAUDAL AS NEEDED
Status: DISCONTINUED | OUTPATIENT
Start: 2025-02-28 | End: 2025-02-28 | Stop reason: SURG

## 2025-02-28 RX ORDER — SODIUM CHLORIDE, SODIUM LACTATE, POTASSIUM CHLORIDE, CALCIUM CHLORIDE 600; 310; 30; 20 MG/100ML; MG/100ML; MG/100ML; MG/100ML
INJECTION, SOLUTION INTRAVENOUS CONTINUOUS
Status: DISCONTINUED | OUTPATIENT
Start: 2025-02-28 | End: 2025-02-28

## 2025-02-28 RX ORDER — MIDAZOLAM HYDROCHLORIDE 1 MG/ML
INJECTION INTRAMUSCULAR; INTRAVENOUS AS NEEDED
Status: DISCONTINUED | OUTPATIENT
Start: 2025-02-28 | End: 2025-02-28 | Stop reason: SURG

## 2025-02-28 RX ORDER — DEXAMETHASONE SODIUM PHOSPHATE 10 MG/ML
INJECTION, SOLUTION INTRAMUSCULAR; INTRAVENOUS AS NEEDED
Status: DISCONTINUED | OUTPATIENT
Start: 2025-02-28 | End: 2025-02-28 | Stop reason: SURG

## 2025-02-28 RX ORDER — HYDROMORPHONE HYDROCHLORIDE 1 MG/ML
0.2 INJECTION, SOLUTION INTRAMUSCULAR; INTRAVENOUS; SUBCUTANEOUS EVERY 5 MIN PRN
Status: DISCONTINUED | OUTPATIENT
Start: 2025-02-28 | End: 2025-02-28

## 2025-02-28 RX ORDER — LIDOCAINE HYDROCHLORIDE 10 MG/ML
INJECTION, SOLUTION EPIDURAL; INFILTRATION; INTRACAUDAL; PERINEURAL AS NEEDED
Status: DISCONTINUED | OUTPATIENT
Start: 2025-02-28 | End: 2025-02-28 | Stop reason: SURG

## 2025-02-28 RX ORDER — ACETAMINOPHEN 500 MG
1000 TABLET ORAL ONCE
Status: DISCONTINUED | OUTPATIENT
Start: 2025-02-28 | End: 2025-02-28 | Stop reason: HOSPADM

## 2025-02-28 RX ORDER — HYDROCODONE BITARTRATE AND ACETAMINOPHEN 5; 325 MG/1; MG/1
2 TABLET ORAL ONCE AS NEEDED
Status: DISCONTINUED | OUTPATIENT
Start: 2025-02-28 | End: 2025-02-28

## 2025-02-28 RX ORDER — METOPROLOL TARTRATE 1 MG/ML
2.5 INJECTION, SOLUTION INTRAVENOUS ONCE
Status: DISCONTINUED | OUTPATIENT
Start: 2025-02-28 | End: 2025-02-28

## 2025-02-28 RX ADMIN — SODIUM CHLORIDE, SODIUM LACTATE, POTASSIUM CHLORIDE, CALCIUM CHLORIDE: 600; 310; 30; 20 INJECTION, SOLUTION INTRAVENOUS at 17:14:00

## 2025-02-28 RX ADMIN — PHENYLEPHRINE HCL 100 MCG: 10 MG/ML VIAL (ML) INJECTION at 15:51:00

## 2025-02-28 RX ADMIN — SODIUM CHLORIDE, SODIUM LACTATE, POTASSIUM CHLORIDE, CALCIUM CHLORIDE: 600; 310; 30; 20 INJECTION, SOLUTION INTRAVENOUS at 15:10:00

## 2025-02-28 RX ADMIN — MIDAZOLAM HYDROCHLORIDE 2 MG: 1 INJECTION INTRAMUSCULAR; INTRAVENOUS at 15:12:00

## 2025-02-28 RX ADMIN — ONDANSETRON 4 MG: 2 INJECTION INTRAMUSCULAR; INTRAVENOUS at 16:45:00

## 2025-02-28 RX ADMIN — BUPIVACAINE HYDROCHLORIDE 30 ML: 5 INJECTION, SOLUTION EPIDURAL; INTRACAUDAL at 15:16:00

## 2025-02-28 RX ADMIN — SODIUM CHLORIDE, SODIUM LACTATE, POTASSIUM CHLORIDE, CALCIUM CHLORIDE: 600; 310; 30; 20 INJECTION, SOLUTION INTRAVENOUS at 16:45:00

## 2025-02-28 RX ADMIN — PHENYLEPHRINE HCL 100 MCG: 10 MG/ML VIAL (ML) INJECTION at 15:56:00

## 2025-02-28 RX ADMIN — CLONIDINE 50 MCG: 100 INJECTION, SOLUTION EPIDURAL at 15:16:00

## 2025-02-28 RX ADMIN — BUPIVACAINE HYDROCHLORIDE 15 ML: 5 INJECTION, SOLUTION EPIDURAL; INTRACAUDAL at 15:20:00

## 2025-02-28 RX ADMIN — DEXAMETHASONE SODIUM PHOSPHATE 2 MG: 10 INJECTION, SOLUTION INTRAMUSCULAR; INTRAVENOUS at 15:16:00

## 2025-02-28 RX ADMIN — LIDOCAINE HYDROCHLORIDE 50 MG: 10 INJECTION, SOLUTION EPIDURAL; INFILTRATION; INTRACAUDAL; PERINEURAL at 15:24:00

## 2025-02-28 RX ADMIN — DEXAMETHASONE SODIUM PHOSPHATE 4 MG: 4 MG/ML VIAL (ML) INJECTION at 15:32:00

## 2025-02-28 NOTE — ANESTHESIA PROCEDURE NOTES
Airway  Date/Time: 2/28/2025 3:25 PM  Urgency: elective      General Information and Staff    Patient location during procedure: OR  Anesthesiologist: Galdino Hernandez MD  Performed: anesthesiologist   Performed by: Galdino Hernandez MD  Authorized by: Galdino Hernandez MD      Indications and Patient Condition  Indications for airway management: anesthesia  Spontaneous Ventilation: absent  Sedation level: deep  Preoxygenated: yes  Patient position: sniffing  Mask difficulty assessment: 1 - vent by mask    Final Airway Details  Final airway type: supraglottic airway      Successful airway: classic  Size 5       Number of attempts at approach: 1  Number of other approaches attempted: 0    Additional Comments    Teeth intact post-procedure.

## 2025-02-28 NOTE — INTERVAL H&P NOTE
Pre-op Diagnosis: ANKLE FRACTURE, BIMALLEOLAR, CLOSED, RIGHT ANKLE SYNDESMOSIS DISRUPTION ,RIGHT    The above referenced H&P was reviewed by Adan Vazquez MD on 2/28/2025, the patient was examined and no significant changes have occurred in the patient's condition since the H&P was performed.  I discussed with the patient and/or legal representative the potential benefits, risks and side effects of this procedure; the likelihood of the patient achieving goals; and potential problems that might occur during recuperation.  I discussed reasonable alternatives to the procedure, including risks, benefits and side effects related to the alternatives and risks related to not receiving this procedure.  We will proceed with procedure as planned.

## 2025-02-28 NOTE — BRIEF OP NOTE
Orthopedic Surgery Brief Operative Note:    Patient Name: Adrien Curran  Age:  70 year old  Sex: male  MRN: [id number]  : 1955  Date of Admission: 2025  Date of Surgery: 25  Primary Surgeon: Adan Vazquez MD  Assistant(s): NONE    Preoperative Diagnosis:   ANKLE FRACTURE, BIMALLEOLAR, CLOSED, RIGHT   ANKLE SYNDESMOSIS DISRUPTION ,RIGHT  Postoperative Diagnosis:   ANKLE FRACTURE, BIMALLEOLAR, CLOSED, RIGHT   ANKLE SYNDESMOSIS DISRUPTION ,RIGHT  Procedure Name:   RIGHT OPEN REPAIR BIMALLEOLAR ANKLE FRACTURE  RIGHT OPEN REPAIR SYNDESMOSIS:   Anesthesia: GETA + REGIONAL  Tourniquet time:   Total Tourniquet Time Documented:  Thigh (Right) - 87 minutes  Total: Thigh (Right) - 87 minutes    Fluids: 800 mL  EBL: 10 mL   Findings: COMMINUTED DISPLACED DELTOID EQUIVALENT BIMALLEOLAR ANKLE FRACTURE AND UNSTABLE SYNDESMOSIS  Implants/Specimens: SYNTHES 1/3 ST PLATE, 3.5 AND 4.0 SCREWS, TWO, 2.7 LAG SCREWS AND A 3.5 SYNDESMOSIS SCREW  Drapes final count correct: YES  Complications: NONE APPARENT  Disposition: TO PACU, THEN HOME    Adan Vazquez MD  [facility]  Orthopedic Surgery,  Foot & Ankle

## 2025-02-28 NOTE — ANESTHESIA PROCEDURE NOTES
Regional Block    Date/Time: 2/28/2025 3:16 PM    Performed by: Galdino Hernandez MD  Authorized by: Galdino Hernandez MD      General Information and Staff    Start Time:  2/28/2025 3:16 PM  End Time:  2/28/2025 3:19 PM  Anesthesiologist:  Galdino Hernandez MD  Performed by:  Anesthesiologist  Patient Location:  OR    Block Placement: Pre Induction  Site Identification: real time ultrasound guided and image stored and retrievable    Block site/laterality marked before start: site marked  Reason for Block: at surgeon's request and post-op pain management    Preanesthetic Checklist: 2 patient identifers, IV checked, site marked, risks and benefits discussed, monitors and equipment checked, pre-op evaluation, timeout performed, anesthesia consent, sterile technique used, no prohibitive neurological deficits and no local skin infection at insertion site      Procedure Details    Patient Position:  Supine  Prep: ChloraPrep    Monitoring:  Cardiac monitor, continuous pulse ox, blood pressure cuff and heart rate  Block Type:  Adductor canal  Laterality:  Right  Injection Technique:  Single-shot    Needle    Needle Type:  Short-bevel and echogenic  Needle Gauge:  21 G  Needle Length:  110 mm  Needle Localization:  Ultrasound guidance  Reason for Ultrasound Use: appropriate spread of the medication was noted in real time and no ultrasound evidence of intravascular and/or intraneural injection            Assessment    Injection Assessment:  Good spread noted, negative resistance, negative aspiration for heme, incremental injection, low pressure and local visualized surrounding nerve on ultrasound  Heart Rate Change: No    - Patient tolerated block procedure well without evidence of immediate block related complications.     Medications  2/28/2025 3:16 PM      Additional Comments    0.5% Bupivacaine 15mL  with decadron PF 2mg,       Private Auto Walk in

## 2025-02-28 NOTE — OPERATIVE REPORT
[facility]    PATIENT'S NAME: MARY KAY DON   ATTENDING PHYSICIAN: Adan Vazquez MD   OPERATING PHYSICIAN: Adan Vazquez MD   PATIENT ACCOUNT#:   [id number]    [facility]  PACU [facility]  MEDICAL RECORD #:   [id number]       YOB: 1955  ADMISSION DATE:       02/28/2025      OPERATION DATE:  02/28/2025    OPERATIVE REPORT      PREOPERATIVE DIAGNOSIS:    1.   Deltoid equivalent right bimalleolar ankle fracture.  2.   Right acute syndesmotic disruption.  POSTOPERATIVE DIAGNOSIS:    1.   Deltoid equivalent right bimalleolar ankle fracture.  2.   Right acute syndesmotic disruption.  PROCEDURE:    1.   Open treatment, right bimalleolar ankle fracture (CPT code 17289).    2.   Open treatment with internal fixation, syndesmosis disruption, right (CPT code 52674-04 modifier).     ASSISTANT:  None.    TOURNIQUET TIME:   87 minutes.    COMPLICATIONS:  None apparent.    ANESTHESIA:  General plus regional.    ESTIMATED BLOOD LOSS:  10 mL.    INTRAVENOUS FLUIDS:  800.    INDICATIONS:  The patient has a deltoid equivalent bimalleolar ankle fracture.  This was confirmed by weightbearing stress views.  He was apprised of the nature of his injury, the risks and benefits and options to treat it.  It is recommended he undergo open reduction, internal fixation, and he consented to that after an opportunity for all his questions to be answered.    FINDINGS:  He had a comminuted, displaced spiral oblique fracture, Cook type B of the lateral malleolus.  He had a small avulsion fracture of his medial malleolus and an unstable syndesmosis to stress testing after the lateral malleolar fracture had been fixed.    OPERATIVE TECHNIQUE:  The patient was identified in the preoperative holding area, surgical site marked, and history and physical updated.  He was then brought into the operating room, placed supine on the operative table, and a regional and general anesthetic were administered.  A tourniquet was placed  on his right proximal thigh under copious Webril padding, and the limb was elevated to exsanguinate it during the prep and drape.  Once the time-out was taken, paused and confirmed by all members of surgical team, the tourniquet was inflated to 300 mmHg.    Attention turned to the lateral aspect of his ankle.  Here, a longitudinal incision in the mid axial line overlying the fibula was made and carried down over a length of about 10 cm.  He was quite swollen.  He had lots of lymphedema within the soft tissues which drained out through the wound during this dissection.  I then carried this dissection down to the level of the periosteum, which I incised as a separate layer; however, the proximal part of the syndesmosis overlying his fracture was completely torn and shredded.    I exposed the fracture, curetted it clean, irrigated it and made sure that there was no interposed soft tissue and then achieved an anatomic reduction and held it with 2 pointed reduction clamps.  This was confirmed with fluoroscopic images.    To begin the fixation of this fracture, I placed two 2.7 cortical lag screws from anterior to posterior perpendicular to the fracture line.  Both screws had excellent fixation and stabilized his fracture well.  I then selected an 8-hole one-third semitubular plate from the small fragment set and contoured it to the bone using the bending irons.  I then fixated it with a combination of bicortical screws proximally and 4-0 cancellous screws distally.    Now, I assessed the medial malleolar small fracture avulsion fragments.  They did not require fixation, and they were reduced.    Now, I performed an abduction and external rotation stress test and found medial clear space widening and lateral translation of the talus.    Now, I reduced the syndesmosis manually and pinned it with a 2.0 K-wire.  Fluoroscopic image was taken to confirm the anatomic reduction of the syndesmosis.  I now drilled and placed a 3.5  cortical screw which traveled through the lateral cortex of the fibula through the plate into the distal tibia at the level of the physeal scar.  This was a 60 mm length screw.  I now took out the K-wire and re-stressed the ankle and now there was no longer any medial clear space widening, tib-fib diastasis, or lateral translation of the talus.  Thus, the syndesmosis had been stably reduced and fixated.    The wound was irrigated, closed in layers.  The patient was placed in a well-molded short-leg splint with his ankle in neutral position.  He was brought to the recovery room having tolerated procedure well.  Of note, all sponge counts and needle counts were correct x2.    DISPOSITION:  The patient will be discharged home and follow up in 2-1/2 weeks' time for wound inspection and suture removal.  It was again reinforced to him the importance of remaining nonweightbearing and elevating his foot above heart level around the clock.  He will use aspirin as his DVT prophylaxis.    Dictated By Adan Vazquez MD  d: 02/28/2025 17:24:43  t: 02/28/2025 17:34:07  [facility] [id number]  [facility]/

## 2025-02-28 NOTE — ANESTHESIA POSTPROCEDURE EVALUATION
[facility]    Adrien JAH Curran Patient Status:  Hospital Outpatient Surgery   Age/Gender 70 year old male MRN [id number]   Location [facility] POST ANESTHESIA CARE UNIT Attending Adan Vazquez MD   Hosp Day # 0 PCP MONICA Saldivar       Anesthesia Post-op Note    RIGHT OPEN REPAIR BIMALLEOLAR ANKLE FRACTURE SYNDESMOSIS    Procedure Summary       Date: 02/28/25 Room / Location:  MAIN OR 12 / [facility] MAIN OR    Anesthesia Start: 1509 Anesthesia Stop: 1721    Procedure: RIGHT OPEN REPAIR BIMALLEOLAR ANKLE FRACTURE SYNDESMOSIS (Right: Ankle) Diagnosis: (ANKLE FRACTURE, BIMALLEOLAR, CLOSED, RIGHT ANKLE SYNDESMOSIS DISRUPTION ,RIGHT)    Surgeons: Adan Vazquez MD Anesthesiologist: Galdino Hernandez MD    Anesthesia Type: general ASA Status: 2            Anesthesia Type: general    Vitals Value Taken Time   /99 02/28/25 1728   Temp 98.3 °F (36.8 °C) 02/28/25 1720   Pulse 58 02/28/25 1728   Resp 15 02/28/25 1728   SpO2 94 % 02/28/25 1728   Vitals shown include unfiled device data.        Patient Location: PACU    Anesthesia Type: general    Airway Patency: patent and extubated    Postop Pain Control: adequate    Mental Status: mildly sedated but able to meaningfully participate in the post-anesthesia evaluation    Nausea/Vomiting: none    Cardiopulmonary/Hydration status: stable euvolemic    Complications: no apparent anesthesia related complications    Postop vital signs: stable    Comments:   The airway was removed in the procedure area.  Cable monitors were removed, and the patient was transported with observation to the recovery area personally with the OR team.  The patient was responsive in a meaningful way and demonstrated a good airway.  PACU monitors were then applied with device connection to Epic.  Full report signout, including report, identifications, history, procedure, anesthesia course, recovery expectations with chance for questions was provided to a responsible recovery RN.    Dental  Exam: Unchanged from Preop    Patient to be discharged from PACU when criteria met.

## 2025-02-28 NOTE — ANESTHESIA PROCEDURE NOTES
Regional Block    Date/Time: 2/28/2025 3:12 PM    Performed by: Galdino Hernandez MD  Authorized by: Galdino Hernandez MD      General Information and Staff    Start Time:  2/28/2025 3:12 PM  End Time:  2/28/2025 3:16 PM  Anesthesiologist:  Galdino Hernandez MD  Performed by:  Anesthesiologist  Patient Location:  OR    Block Placement: Pre Induction  Site Identification: real time ultrasound guided and image stored and retrievable    Block site/laterality marked before start: site marked  Reason for Block: at surgeon's request and post-op pain management    Preanesthetic Checklist: 2 patient identifers, IV checked, site marked, risks and benefits discussed, monitors and equipment checked, pre-op evaluation, timeout performed, anesthesia consent, sterile technique used, no prohibitive neurological deficits and no local skin infection at insertion site      Procedure Details    Patient Position:  Supine  Prep: ChloraPrep    Monitoring:  Cardiac monitor, continuous pulse ox, blood pressure cuff and heart rate  Block Type:  Popliteal  Laterality:  Right  Injection Technique:  Single-shot    Needle    Needle Type:  Short-bevel and echogenic  Needle Gauge:  21 G  Needle Length:  110 mm  Needle Localization:  Ultrasound guidance  Reason for Ultrasound Use: appropriate spread of the medication was noted in real time and no ultrasound evidence of intravascular and/or intraneural injection            Assessment    Injection Assessment:  Good spread noted, negative resistance, negative aspiration for heme, incremental injection, low pressure and local visualized surrounding nerve on ultrasound  Heart Rate Change: No    - Patient tolerated block procedure well without evidence of immediate block related complications.     Medications  2/28/2025 3:12 PM      Additional Comments    0.5% Bupivacaine 30mL  with decadron PF 2mg, clonidine 50mcg

## 2025-02-28 NOTE — ANESTHESIA PREPROCEDURE EVALUATION
PRE-OP EVALUATION    Patient Name: Adrien Penap    Admit Diagnosis: ANKLE FRACTURE, BIMALLEOLAR, CLOSED, RIGHT ANKLE SYNDESMOSIS DISRUPTION ,RIGHT    Pre-op Diagnosis: ANKLE FRACTURE, BIMALLEOLAR, CLOSED, RIGHT ANKLE SYNDESMOSIS DISRUPTION ,RIGHT    RIGHT OPEN REPAIR BIMALLEOLAR ANKLE FRACTURE POSSIBLE SYNDESMOSIS    Anesthesia Procedure: RIGHT OPEN REPAIR BIMALLEOLAR ANKLE FRACTURE POSSIBLE SYNDESMOSIS (Right)    Surgeons and Role:     * Adan Vazquez MD - Primary    Pre-op vitals reviewed.  Temp: 98.3 °F (36.8 °C)  Pulse: 56  Resp: 16  BP: 132/86  SpO2: 98 %  Body mass index is 29.27 kg/m².    Current medications reviewed.  Hospital Medications:   [Transfer Hold] acetaminophen (Tylenol Extra Strength) tab 1,000 mg  1,000 mg Oral Once    lactated ringers infusion   Intravenous Continuous    vancomycin (Vancocin) 1.5 g in sodium chloride 0.9% 250mL IVPB premix  15 mg/kg Intravenous Once    And    gentamicin (Garamycin) 460 mg in sodium chloride 0.9% 100 mL IVPB  5 mg/kg (Adjusted) Intravenous Once    lactated ringers infusion   Intravenous Continuous    lactated ringers IV bolus 500 mL  500 mL Intravenous Once PRN    acetaminophen (Tylenol Extra Strength) tab 1,000 mg  1,000 mg Oral Once PRN    Or    HYDROcodone-acetaminophen (Norco) 5-325 MG per tab 1 tablet  1 tablet Oral Once PRN    Or    HYDROcodone-acetaminophen (Norco) 5-325 MG per tab 2 tablet  2 tablet Oral Once PRN    metoprolol (Lopressor) 5 mg/5mL injection 2.5 mg  2.5 mg Intravenous Once    atropine 0.1 MG/ML injection 0.5 mg  0.5 mg Intravenous PRN    labetalol (Trandate) 5 mg/mL injection 5 mg  5 mg Intravenous Q5 Min PRN    ondansetron (Zofran) 4 MG/2ML injection 4 mg  4 mg Intravenous Q6H PRN    metoclopramide (Reglan) 5 mg/mL injection 10 mg  10 mg Intravenous Q8H PRN    naloxone (Narcan) 0.4 MG/ML injection 80 mcg  80 mcg Intravenous PRN    fentaNYL (Sublimaze) 50 mcg/mL injection 25 mcg  25 mcg Intravenous Q5 Min PRN    Or    fentaNYL  (Sublimaze) 50 mcg/mL injection 50 mcg  50 mcg Intravenous Q5 Min PRN    HYDROmorphone (Dilaudid) 1 MG/ML injection 0.2 mg  0.2 mg Intravenous Q5 Min PRN    Or    HYDROmorphone (Dilaudid) 1 MG/ML injection 0.3 mg  0.3 mg Intravenous Q5 Min PRN    Or    HYDROmorphone (Dilaudid) 1 MG/ML injection 0.4 mg  0.4 mg Intravenous Q5 Min PRN       Outpatient Medications:   Prescriptions Prior to Admission[1]    Allergies: Cephalosporins      Anesthesia Evaluation    Patient summary reviewed.    Anesthetic Complications  (-) history of anesthetic complications         GI/Hepatic/Renal      (-) GERD                           Cardiovascular        Exercise tolerance: good     MET: >4    (-) obesity  (+) hypertension     (+) CAD                  (-) angina     (-) PRESCOTT         Endo/Other      (-) diabetes                            Pulmonary      (-) asthma  (-) COPD       (-) shortness of breath     (-) sleep apnea       Neuro/Psych                              There is no problem list on file for this patient.            History reviewed. No pertinent surgical history.  Social History     Socioeconomic History    Marital status:    Tobacco Use    Smoking status: Never    Smokeless tobacco: Never   Vaping Use    Vaping status: Never Used   Substance and Sexual Activity    Alcohol use: Not Currently    Drug use: Never     History   Drug Use Unknown     Available pre-op labs reviewed.               Airway      Mallampati: II  Mouth opening: >3 FB  TM distance: 4 - 6 cm  Neck ROM: full Cardiovascular    Cardiovascular exam normal.  Rhythm: regular  Rate: normal     Dental  Comment: Patient denies any loose/missing/cracked teeth. No gross abnormalities or loose teeth noted on exam.      Dentition appears grossly intact         Pulmonary    Pulmonary exam normal.                 Other findings              ASA: 2   Plan: general  NPO status verified and patient meets guidelines.    Post-procedure pain management plan discussed  with surgeon and patient.  Surgeon requests: regional block  Comment:     A detailed discussion about the anesthetic plan was held with Adrien Curran in the preoperative area. Discussed benefits and risks of general anesthesia including, reasonable expectations of post-operative pain, nausea, vomiting, injury to lips, gums, tongue, teeth, eyes, awareness under anesthesia, cardiac, pulmonary, aspiration, stroke, and death. All questions were answered appropriately and patient demonstrated understanding of realistic expectations and risks of undergoing anesthesia. Adrien Penap consents to receiving anesthesia and wishes to proceed.    Per surgeon's request, I explained the option and benefit of popliteal nerve block and saphenous nerve block (ACB) to Adrien Curran including significant. pain relief following the surgical procedure and decreased need for postoperative opioid use. Realistic expectation for block duration was discussed as well. I also explained possible downsides of peripheral nerve blocks including incomplete coverage, breakthrough pain, and failed block, prolonged nerve blockade leading to temporary prolonged numbness in lower extremity and possible muscle weakness leading to \"foot-drop\" necessitating walking boot and falls precautions. Other exceedingly rare complications such as local anesthetic systemic toxicity (LAST), infection, hematoma formation, and permanent nerve damage at the site of block was also discussed. All questions were answered and patient demonstrated understanding of realistic expectations and risks of peripheral nerve blocks, and wishes to proceed with the procedure as planned.   Plan/risks discussed with: patient                Present on Admission:  **None**             [1]   Medications Prior to Admission   Medication Sig Dispense Refill Last Dose/Taking    oxyCODONE 5 MG Oral Tab Take 1 tablet (5 mg total) by mouth.   2/28/2025 at 12:00 PM    PARoxetine HCl 40 MG Oral Tab Take 1  tablet (40 mg total) by mouth nightly.   2/27/2025 Bedtime    omeprazole 20 MG Oral Capsule Delayed Release Take 1 capsule (20 mg total) by mouth every morning before breakfast.   2/28/2025 Morning    LORazepam 0.5 MG Oral Tab Take 1 tablet (0.5 mg total) by mouth daily as needed for Anxiety.   2/28/2025 Morning    nabumetone 750 MG Oral Tab Take 1 tablet (750 mg total) by mouth daily.   2/28/2025 Morning    gabapentin 300 MG Oral Cap Take 1 capsule (300 mg total) by mouth nightly.   2/28/2025 Morning    tamsulosin 0.4 MG Oral Cap Take 1 capsule (0.4 mg total) by mouth at bedtime.   2/27/2025 Bedtime    amitriptyline 50 MG Oral Tab Take 1 tablet (50 mg total) by mouth nightly.   2/27/2025 Bedtime    metoprolol succinate  MG Oral Tablet 24 Hr Take 1 tablet (100 mg total) by mouth daily.   2/28/2025 Morning    sacubitril-valsartan  MG Oral Tab Take 1 tablet by mouth 2 (two) times daily.   2/27/2025 Morning    Vitamin C 500 MG Oral Tab Take 1 tablet (500 mg total) by mouth daily.   2/28/2025 Morning    Glucosamine-Chondroit-Vit C-Mn (GLUCOSAMINE CHONDR 500 COMPLEX OR) Take 1 tablet by mouth daily.   2/28/2025 Morning

## 2025-03-14 ENCOUNTER — APPOINTMENT (OUTPATIENT)
Age: 70
End: 2025-03-14

## 2025-03-14 DIAGNOSIS — F41.1 GENERALIZED ANXIETY DISORDER: ICD-10-CM

## 2025-03-14 DIAGNOSIS — F33.1 MODERATE EPISODE OF RECURRENT MAJOR DEPRESSIVE DISORDER (CMD): Primary | ICD-10-CM

## 2025-03-14 PROCEDURE — 99213 OFFICE O/P EST LOW 20 MIN: CPT | Performed by: PSYCHIATRY & NEUROLOGY

## 2025-03-14 RX ORDER — PAROXETINE 40 MG/1
40 TABLET, FILM COATED ORAL DAILY
Qty: 90 TABLET | Refills: 0 | Status: SHIPPED | OUTPATIENT
Start: 2025-03-14

## 2025-03-14 RX ORDER — GABAPENTIN 300 MG/1
300 CAPSULE ORAL 2 TIMES DAILY
Qty: 180 CAPSULE | Refills: 0 | Status: SHIPPED | OUTPATIENT
Start: 2025-03-14

## 2025-03-14 RX ORDER — AMITRIPTYLINE HYDROCHLORIDE 50 MG/1
50 TABLET ORAL NIGHTLY
Qty: 90 TABLET | Refills: 0 | Status: SHIPPED | OUTPATIENT
Start: 2025-03-14

## 2025-03-14 RX ORDER — LORAZEPAM 0.5 MG/1
0.5 TABLET ORAL EVERY 8 HOURS PRN
Qty: 200 TABLET | Refills: 0 | Status: SHIPPED | OUTPATIENT
Start: 2025-03-14

## 2025-05-02 ENCOUNTER — TELEPHONE (OUTPATIENT)
Age: 70
End: 2025-05-02

## 2025-06-06 ENCOUNTER — TELEPHONE (OUTPATIENT)
Age: 70
End: 2025-06-06

## 2025-06-09 ENCOUNTER — TELEPHONE (OUTPATIENT)
Age: 70
End: 2025-06-09

## 2025-06-12 ENCOUNTER — APPOINTMENT (OUTPATIENT)
Age: 70
End: 2025-06-12

## 2025-06-25 ENCOUNTER — EXTERNAL LAB (OUTPATIENT)
Dept: HEALTH INFORMATION MANAGEMENT | Facility: OTHER | Age: 70
End: 2025-06-25

## 2025-06-25 LAB — HEMOCCULT STL QL: NOT DETECTED

## 2025-07-14 ENCOUNTER — APPOINTMENT (OUTPATIENT)
Age: 70
End: 2025-07-14

## 2025-07-29 ENCOUNTER — APPOINTMENT (OUTPATIENT)
Age: 70
End: 2025-07-29

## 2025-07-29 DIAGNOSIS — F33.1 MODERATE EPISODE OF RECURRENT MAJOR DEPRESSIVE DISORDER (CMD): Primary | ICD-10-CM

## 2025-07-29 DIAGNOSIS — F41.1 GENERALIZED ANXIETY DISORDER: ICD-10-CM

## 2025-07-29 PROCEDURE — 99213 OFFICE O/P EST LOW 20 MIN: CPT | Performed by: PSYCHIATRY & NEUROLOGY

## 2025-07-29 RX ORDER — GABAPENTIN 300 MG/1
300 CAPSULE ORAL 2 TIMES DAILY
Qty: 180 CAPSULE | Refills: 0 | Status: SHIPPED | OUTPATIENT
Start: 2025-07-29

## 2025-07-29 RX ORDER — AMITRIPTYLINE HYDROCHLORIDE 50 MG/1
50 TABLET ORAL NIGHTLY
Qty: 90 TABLET | Refills: 0 | Status: SHIPPED | OUTPATIENT
Start: 2025-07-29

## 2025-07-29 RX ORDER — PAROXETINE 40 MG/1
40 TABLET, FILM COATED ORAL DAILY
Qty: 90 TABLET | Refills: 0 | Status: SHIPPED | OUTPATIENT
Start: 2025-07-29

## 2025-07-29 RX ORDER — LORAZEPAM 0.5 MG/1
0.5 TABLET ORAL EVERY 8 HOURS PRN
Qty: 200 TABLET | Refills: 0 | Status: SHIPPED | OUTPATIENT
Start: 2025-07-29

## (undated) DEVICE — DRESSING ADAPTIC L16XW3IN OIL ADH

## (undated) DEVICE — GLOVE SUR 8.5 SENSICARE PI PIP GRN PWD F

## (undated) DEVICE — SOLUTION IRRIG 1000ML 0.9% NACL USP BTL

## (undated) DEVICE — ANTIBACTERIAL UNDYED BRAIDED (POLYGLACTIN 910), SYNTHETIC ABSORBABLE SUTURE: Brand: COATED VICRYL

## (undated) DEVICE — GLOVE SUR 8.5 SENSICARE PI PIP CRM PWD F

## (undated) DEVICE — C-ARM: Brand: UNBRANDED

## (undated) DEVICE — PADDING CAST 6INX4YD 100% COT ABSRB HIGHLY

## (undated) DEVICE — BANDAGE,COHESIVE,TAN,4X5YD,LF,STRL: Brand: MEDLINE

## (undated) DEVICE — SLEEVE COMPR MD KNEE LEN SGL USE KENDALL SCD

## (undated) DEVICE — BANDAGE CAST 5X30IN HT PLSTR X FAST SET

## (undated) DEVICE — LOWER EXTREMITY CDS-LF: Brand: MEDLINE INDUSTRIES, INC.

## (undated) DEVICE — SUT ETHLN 3-0 18IN PS-1 NABSRB BLK 24MM 3/8 C

## (undated) DEVICE — PADDING CAST W4INXL4YD ST COHESIVE LP

## (undated) DEVICE — DISPOSABLE TOURNIQUET CUFF SINGLE BLADDER, DUAL PORT AND QUICK CONNECT CONNECTOR: Brand: COLOR CUFF

## (undated) NOTE — LETTER
OUTSIDE TESTING RESULT REQUEST     IMPORTANT: FOR YOUR IMMEDIATE ATTENTION  Please FAX all test results listed below to: [phone]     Testing already done on or about: Pt would like you to order     * * * * If testing is NOT complete, arrange with patient A.S.A.P. * * * *      Patient Name: Adrien Curran  Surgery Date: 2025  Medical Record: [id number]  CSN: [id number]  : 1955 - A: 70 y     Sex: male  Surgeon(s):  Adan Vazquez MD  Procedure: RIGHT OPEN REPAIR BIMALLEOLAR ANKLE FRACTURE POSSIBLE SYNDESMOSIS  Anesthesia Type: General     Surgeon: Adan Vazquez MD     The following Testing and Time Line are REQUIRED PER ANESTHESIA     EKG READ AND SIGNED WITHIN   90 days      Thank You,   Sent by: Margaret KIM

## (undated) NOTE — LETTER
[address]  [address]  Authorization for Surgical Operation and Procedure     Date:___________                                                                                                         Time:__________  I hereby authorize Surgeon(s):  Adan Vazquez MD, my physician and his/her assistants (if applicable), which may include medical students, residents, and/or fellows, to perform the following surgical operation/ procedure and administer such anesthesia as may be determined necessary by my physician:  Operation/Procedure name (s) Procedure(s):  RIGHT OPEN REPAIR BIMALLEOLAR ANKLE FRACTURE POSSIBLE SYNDESMOSIS on Adrien JAH Curran   2.   I recognize that during the surgical operation/procedure, unforeseen conditions may necessitate additional or different procedures than those listed above.  I, therefore, further authorize and request that the above-named surgeon, assistants, or designees perform such procedures as are, in their judgment, necessary and desirable.    3.   My surgeon/physician has discussed prior to my surgery the potential benefits, risks and side effects of this procedure; the likelihood of achieving goals; and potential problems that might occur during recuperation.  They also discussed reasonable alternatives to the procedure, including risks, benefits, and side effects related to the alternatives and risks related to not receiving this procedure.  I have had all my questions answered and I acknowledge that no guarantee has been made as to the result that may be obtained.    4.   Should the need arise during my operation/procedure, which includes change of level of care prior to discharge, I also consent to the administration of blood and/or blood products.  Further, I understand that despite careful testing and screening of blood or blood products by collecting agencies, I may still be subject to ill effects as a result of receiving a blood  transfusion and/or blood products.  The following are some, but not all, of the potential risks that can occur: fever and allergic reactions, hemolytic reactions, transmission of diseases such as Hepatitis, AIDS and Cytomegalovirus (CMV) and fluid overload.  In the event that I wish to have an autologous transfusion of my own blood, or a directed donor transfusion, I will discuss this with my physician.  Check only if Refusing Blood or Blood Products  I understand refusal of blood or blood products as deemed necessary by my physician may have serious consequences to my condition to include possible death. I hereby assume responsibility for my refusal and release the hospital, its personnel, and my physicians from any responsibility for the consequences of my refusal.          o  Refuse      5.   I authorize the use of any specimen, organs, tissues, body parts or foreign objects that may be removed from my body during the operation/procedure for diagnosis, research or teaching purposes and their subsequent disposal by hospital authorities.  I also authorize the release of specimen test results and/or written reports to my treating physician on the hospital medical staff or other referring or consulting physicians involved in my care, at the discretion of the Pathologist or my treating physician.    6.   I consent to the photographing or videotaping of the operations or procedures to be performed, including appropriate portions of my body for medical, scientific, or educational purposes, provided my identity is not revealed by the pictures or by descriptive texts accompanying them.  If the procedure has been photographed/videotaped, the surgeon will obtain the original picture, image, videotape or CD.  The hospital will not be responsible for storage, release or maintenance of the picture, image, tape or CD.    7.   I consent to the presence of a  or observers in the operating room as deemed  necessary by my physician or their designees.    8.   I recognize that in the event my procedure results in extended X-Ray/fluoroscopy time, I may develop a skin reaction.    9. If I have a Do Not Attempt Resuscitation (DNAR) order in place, that status will be suspended while in the operating room, procedural suite, and during the recovery period unless otherwise explicitly stated by me (or a person authorized to consent on my behalf). The surgeon or my attending physician will determine when the applicable recovery period ends for purposes of reinstating the DNAR order.  10. Patients having a sterilization procedure: I understand that if the procedure is successful the results will be permanent and it will therefore be impossible for me to inseminate, conceive, or bear children.  I also understand that the procedure is intended to result in sterility, although the result has not been guaranteed.   11. I acknowledge that my physician has explained sedation/analgesia administration to me including the risk and benefits I consent to the administration of sedation/analgesia as may be necessary or desirable in the judgment of my physician.    I CERTIFY THAT I HAVE READ AND FULLY UNDERSTAND THE ABOVE CONSENT TO OPERATION and/or OTHER PROCEDURE.    _________________________________________  __________________________________  Signature of Patient     Signature of Responsible Person         ___________________________________         Printed Name of Responsible Person           _________________________________                 Relationship to Patient  _________________________________________  ______________________________  Signature of Witness          Date  Time      Patient Name: Adrien Curran     : 1955                 Printed: 2025     Medical Record #: [id number]                     Page 1 of 2                                    [address]   [address]    Consent for Anesthesia    IAdrien agree to be cared for by an anesthesiologist, who is specially trained to monitor me and give me medicine to put me to sleep or keep me comfortable during my procedure    I understand that my anesthesiologist is not an employee or agent of [facility] or [employer]. He or she works for [employer].    As the patient asking for anesthesia services, I agree to:  Allow the anesthesiologist (anesthesia doctor) to give me medicine and do additional procedures as necessary. Some examples are: Starting or using an “IV” to give me medicine, fluids or blood during my procedure, and having a breathing tube placed to help me breathe when I’m asleep (intubation). In the event that my heart stops working properly, I understand that my anesthesiologist will make every effort to sustain my life, unless otherwise directed by [facility] Do Not Resuscitate documents.  Tell my anesthesia doctor before my procedure:  If I am pregnant.  The last time that I ate or drank.  All of the medicines I take (including prescriptions, herbal supplements, and pills I can buy without a prescription (including street drugs/illegal medications). Failure to inform my anesthesiologist about these medicines may increase my risk of anesthetic complications.  If I am allergic to anything or have had a reaction to anesthesia before.  I understand how the anesthesia medicine will help me (benefits).  I understand that with any type of anesthesia medicine there are risks:  The most common risks are: nausea, vomiting, sore throat, muscle soreness, damage to my eyes, mouth, or teeth (from breathing tube placement).  Rare risks include: remembering what happened during my procedure, allergic reactions to medications, injury to my airway, heart, lungs, vision, nerves, or muscles and in extremely rare instances death.  My doctor has explained to me other choices available to  me for my care (alternatives).  Pregnant Patients (“epidural”):  I understand that the risks of having an epidural (medicine given into my back to help control pain during labor), include itching, low blood pressure, difficulty urinating, headache or slowing of the baby’s heart. Very rare risks include infection, bleeding, seizure, irregular heart rhythms and nerve injury.  Regional Anesthesia (“spinal”, “epidural”, & “nerve blocks”):  I understand that rare but potential complications include headache, bleeding, infection, seizure, irregular heart rhythms, and nerve injury.    I can change my mind about having anesthesia services at any time before I get the medicine.    _____________________________________________________________________________  Patient (or Representative) Signature/Relationship to Patient  Date   Time    _____________________________________________________________________________   Name (if used)    Language/Organization   Time    _____________________________________________________________________________  Anesthesiologist Signature     Date   Time  I have discussed the procedure and information above with the patient (or patient’s representative) and answered their questions. The patient or their representative has agreed to have anesthesia services.    _____________________________________________________________________________  Witness        Date   Time  I have verified that the signature is that of the patient or patient’s representative, and that it was signed before the procedure  Patient Name: Adrien TYSON Magdy     : 1955                 Printed: 2025     Medical Record #: [id number]                     Page 2 of 2

## (undated) NOTE — LETTER
OUTSIDE TESTING RESULT REQUEST     IMPORTANT: FOR YOUR IMMEDIATE ATTENTION  Please FAX all test results listed below to: [phone]     Testing already done on or about:      * * * * If testing is NOT complete, arrange with patient A.S.A.P. * * * *      Patient Name: Adrien Curran  Surgery Date: 2025  Medical Record: [id number]  CSN: [id number]  : 1955 - A: 70 y     Sex: male  Surgeon(s):  Adan Vazquez MD  Procedure: RIGHT OPEN REPAIR BIMALLEOLAR ANKLE FRACTURE POSSIBLE SYNDESMOSIS  Anesthesia Type: General     Surgeon: Adan Vazquez MD     The following Testing and Time Line are REQUIRED PER ANESTHESIA     EKG READ AND SIGNED WITHIN   90 days      Thank You,   Sent by: Margaret KIM